# Patient Record
Sex: FEMALE | Race: BLACK OR AFRICAN AMERICAN | NOT HISPANIC OR LATINO | URBAN - METROPOLITAN AREA
[De-identification: names, ages, dates, MRNs, and addresses within clinical notes are randomized per-mention and may not be internally consistent; named-entity substitution may affect disease eponyms.]

---

## 2022-08-30 ENCOUNTER — APPOINTMENT (RX ONLY)
Dept: URBAN - METROPOLITAN AREA OTHER 5 | Facility: OTHER | Age: 77
Setting detail: DERMATOLOGY
End: 2022-08-30

## 2022-08-30 DIAGNOSIS — Z41.9 ENCOUNTER FOR PROCEDURE FOR PURPOSES OTHER THAN REMEDYING HEALTH STATE, UNSPECIFIED: ICD-10-CM

## 2022-08-30 PROCEDURE — ? COSMETIC CONSULTATION: BOTOX

## 2022-08-30 PROCEDURE — ? PATIENT SPECIFIC COUNSELING

## 2022-08-30 PROCEDURE — ? COSMETIC CONSULTATION: FILLERS

## 2022-08-30 NOTE — PROCEDURE: PATIENT SPECIFIC COUNSELING
Quoted: \\nRHA4- $900- $1,000\\nRHA3 $700-$800\\nBotox (4 units) $60\\n* Today’s $120 consultation fee would be deducted from the price.
Detail Level: Zone

## 2022-08-30 NOTE — HPI: COSMETIC (BOTOX)
Have You Had Botox Before?: has not had botox
Additional History: Patient wanting to improve appearance of aging face.